# Patient Record
Sex: MALE | Race: WHITE | Employment: FULL TIME | URBAN - METROPOLITAN AREA
[De-identification: names, ages, dates, MRNs, and addresses within clinical notes are randomized per-mention and may not be internally consistent; named-entity substitution may affect disease eponyms.]

---

## 2020-03-24 ENCOUNTER — HOSPITAL ENCOUNTER (EMERGENCY)
Age: 30
Discharge: HOME | End: 2020-03-24
Attending: EMERGENCY MEDICINE
Payer: COMMERCIAL

## 2020-03-24 ENCOUNTER — APPOINTMENT (OUTPATIENT)
Dept: ULTRASOUND IMAGING | Age: 30
End: 2020-03-24
Attending: EMERGENCY MEDICINE
Payer: COMMERCIAL

## 2020-03-24 VITALS
WEIGHT: 220 LBS | BODY MASS INDEX: 43.19 KG/M2 | HEART RATE: 92 BPM | OXYGEN SATURATION: 99 % | TEMPERATURE: 98 F | RESPIRATION RATE: 16 BRPM | DIASTOLIC BLOOD PRESSURE: 95 MMHG | HEIGHT: 60 IN | SYSTOLIC BLOOD PRESSURE: 152 MMHG

## 2020-03-24 DIAGNOSIS — N50.819 TESTICULAR PAIN: Primary | ICD-10-CM

## 2020-03-24 LAB
ANION GAP SERPL CALC-SCNC: 4 MMOL/L (ref 7–16)
BASOPHILS # BLD: 0 K/UL (ref 0–0.2)
BASOPHILS NFR BLD: 1 % (ref 0–2)
BUN SERPL-MCNC: 13 MG/DL (ref 6–23)
CALCIUM SERPL-MCNC: 9.4 MG/DL (ref 8.3–10.4)
CHLORIDE SERPL-SCNC: 106 MMOL/L (ref 98–107)
CO2 SERPL-SCNC: 29 MMOL/L (ref 21–32)
CREAT SERPL-MCNC: 1.09 MG/DL (ref 0.8–1.5)
DIFFERENTIAL METHOD BLD: NORMAL
EOSINOPHIL # BLD: 0 K/UL (ref 0–0.8)
EOSINOPHIL NFR BLD: 1 % (ref 0.5–7.8)
ERYTHROCYTE [DISTWIDTH] IN BLOOD BY AUTOMATED COUNT: 12.1 % (ref 11.9–14.6)
GLUCOSE SERPL-MCNC: 88 MG/DL (ref 65–100)
HCT VFR BLD AUTO: 44.6 % (ref 41.1–50.3)
HGB BLD-MCNC: 15.2 G/DL (ref 13.6–17.2)
IMM GRANULOCYTES # BLD AUTO: 0 K/UL (ref 0–0.5)
IMM GRANULOCYTES NFR BLD AUTO: 1 % (ref 0–5)
LYMPHOCYTES # BLD: 2 K/UL (ref 0.5–4.6)
LYMPHOCYTES NFR BLD: 31 % (ref 13–44)
MCH RBC QN AUTO: 29.8 PG (ref 26.1–32.9)
MCHC RBC AUTO-ENTMCNC: 34.1 G/DL (ref 31.4–35)
MCV RBC AUTO: 87.5 FL (ref 79.6–97.8)
MONOCYTES # BLD: 0.5 K/UL (ref 0.1–1.3)
MONOCYTES NFR BLD: 8 % (ref 4–12)
NEUTS SEG # BLD: 3.9 K/UL (ref 1.7–8.2)
NEUTS SEG NFR BLD: 59 % (ref 43–78)
NRBC # BLD: 0 K/UL (ref 0–0.2)
PLATELET # BLD AUTO: 252 K/UL (ref 150–450)
PMV BLD AUTO: 9.6 FL (ref 9.4–12.3)
POTASSIUM SERPL-SCNC: 3.7 MMOL/L (ref 3.5–5.1)
RBC # BLD AUTO: 5.1 M/UL (ref 4.23–5.6)
SODIUM SERPL-SCNC: 139 MMOL/L (ref 136–145)
WBC # BLD AUTO: 6.5 K/UL (ref 4.3–11.1)

## 2020-03-24 PROCEDURE — 85025 COMPLETE CBC W/AUTO DIFF WBC: CPT

## 2020-03-24 PROCEDURE — 99282 EMERGENCY DEPT VISIT SF MDM: CPT

## 2020-03-24 PROCEDURE — 76870 US EXAM SCROTUM: CPT

## 2020-03-24 PROCEDURE — 81003 URINALYSIS AUTO W/O SCOPE: CPT

## 2020-03-24 PROCEDURE — 80048 BASIC METABOLIC PNL TOTAL CA: CPT

## 2020-03-24 NOTE — ED PROVIDER NOTES
Rey Montiel is a 34 y.o. male seen on 3/24/2020 at 11:50 AM in the [facility] EMERGENCY DEPT in room ER15/15. Chief Complaint   Patient presents with    Testicle Pain     HPI: 44-year-old male presenting to the emergency department complaint of right testicular pain. His symptoms began on Thursday. He notes pain that is constant but waxes and wanes in intensity. The pain is primarily in his right testicle. He has not noticed any redness or swelling of the testicle. For the first 2 to 3 days his testicle seemed very tender but today it seems less so. He notes that today he also felt some discomfort in his right flank but that has also resolved. He has a history of an inguinal hernia on the right but notes that this feels different. There are no obvious alleviating or exacerbating symptoms. He is not had any pain with urination increased urinary frequency or discharge. No history of sexually transmitted infections. He is sexually active with a single partner. Denies abdominal pain, no history of kidney stones. Historian: patient    REVIEW OF SYSTEMS     Review of Systems   Constitutional: Negative for fatigue and fever. HENT: Negative. Eyes: Negative. Respiratory: Negative for cough, chest tightness, shortness of breath and wheezing. Cardiovascular: Negative for chest pain. Gastrointestinal: Negative for abdominal distention, abdominal pain, diarrhea, nausea and vomiting. Genitourinary: Positive for flank pain and testicular pain. Negative for dysuria, frequency, genital sores and urgency. Skin: Negative for rash. Neurological: Negative for dizziness, syncope and headaches. All other systems reviewed and are negative. PAST MEDICAL HISTORY     No past medical history on file. No past surgical history on file.   Social History     Socioeconomic History    Marital status: SINGLE     Spouse name: Not on file    Number of children: Not on file    Years of education: Not on file    Highest education level: Not on file     None     No Known Allergies     PHYSICAL EXAM       There were no vitals filed for this visit. Vital signs were reviewed. Physical Exam  Vitals signs reviewed. Constitutional:       General: He is not in acute distress. Appearance: He is well-developed. He is not diaphoretic. HENT:      Head: Normocephalic and atraumatic. Eyes:      Pupils: Pupils are equal, round, and reactive to light. Neck:      Musculoskeletal: Normal range of motion and neck supple. Cardiovascular:      Rate and Rhythm: Normal rate and regular rhythm. Heart sounds: Normal heart sounds. No murmur. No friction rub. No gallop. Pulmonary:      Effort: Pulmonary effort is normal. No respiratory distress. Breath sounds: Normal breath sounds. No wheezing. Abdominal:      General: Bowel sounds are normal. There is no distension. Palpations: Abdomen is soft. There is no mass. Tenderness: There is no abdominal tenderness. There is no guarding or rebound. Hernia: There is no hernia in the right inguinal area or left inguinal area. Genitourinary:     Penis: Normal and circumcised. Scrotum/Testes:         Right: Mass, tenderness or swelling not present. Cremasteric reflex is present. Left: Mass, tenderness or swelling not present. Cremasteric reflex is present. Comments: No abnormal lie  Musculoskeletal: Normal range of motion. General: No tenderness or deformity. Skin:     General: Skin is warm. Findings: No erythema or rash. Neurological:      Mental Status: He is alert and oriented to person, place, and time. Sensory: No sensory deficit. Coordination: Coordination normal.      Comments: No focal deficits   Psychiatric:         Behavior: Behavior normal.         Thought Content:  Thought content normal.          MEDICAL DECISION MAKING     ED Course: Patient's history is relatively nonspecific. Is not classic for torsion, epididymitis, orchitis, stone etc.  We will begin with scrotal ultrasound to evaluate for torsion/epididymitis. On exam he has no findings concerning for hernia. If his scrotal ultrasound is normal he may benefit from a CT urogram to evaluate for kidney stone. Disposition: Care transition to Dr. Millie Faustin  Diagnosis:  R testicle pain  ____________________________________________________________________  A portion of this note was generated using voice recognition dictation software. While the note has been reviewed for accuracy, please note certain words and phrases may not be transcribed as intended and some grammatical and/or typographical errors may be present.

## 2020-03-24 NOTE — ED NOTES
I have reviewed discharge instructions with the patient. The patient verbalized understanding. Patient left ED via Discharge Method: ambulatory to Home with self    Opportunity for questions and clarification provided. Patient given 0 scripts. To continue your aftercare when you leave the hospital, you may receive an automated call from our care team to check in on how you are doing. This is a free service and part of our promise to provide the best care and service to meet your aftercare needs.  If you have questions, or wish to unsubscribe from this service please call [phone]. Thank you for Choosing our [address] [place] [address] Emergency Department.

## 2020-03-24 NOTE — ED TRIAGE NOTES
Pt comes to EMS and says testicle pain Thursday night on right side. Persisted throughout Thursday and to Friday. Saturday back started hurting with sharp pain on right side and now on both sides and now into back.

## 2020-04-02 ENCOUNTER — APPOINTMENT (OUTPATIENT)
Dept: CT IMAGING | Age: 30
End: 2020-04-02
Attending: NURSE PRACTITIONER
Payer: COMMERCIAL

## 2020-04-02 ENCOUNTER — HOSPITAL ENCOUNTER (EMERGENCY)
Age: 30
Discharge: HOME | End: 2020-04-02
Attending: EMERGENCY MEDICINE
Payer: COMMERCIAL

## 2020-04-02 VITALS
OXYGEN SATURATION: 98 % | HEART RATE: 79 BPM | SYSTOLIC BLOOD PRESSURE: 158 MMHG | TEMPERATURE: 98.2 F | BODY MASS INDEX: 29.8 KG/M2 | WEIGHT: 220 LBS | RESPIRATION RATE: 16 BRPM | HEIGHT: 72 IN | DIASTOLIC BLOOD PRESSURE: 79 MMHG

## 2020-04-02 DIAGNOSIS — R10.9 FLANK PAIN: Primary | ICD-10-CM

## 2020-04-02 DIAGNOSIS — R30.0 DYSURIA: ICD-10-CM

## 2020-04-02 LAB
ALBUMIN SERPL-MCNC: 4.3 G/DL (ref 3.5–5)
ALBUMIN/GLOB SERPL: 1.3 {RATIO} (ref 1.2–3.5)
ALP SERPL-CCNC: 78 U/L (ref 50–136)
ALT SERPL-CCNC: 32 U/L (ref 12–65)
ANION GAP SERPL CALC-SCNC: 8 MMOL/L (ref 7–16)
AST SERPL-CCNC: 18 U/L (ref 15–37)
BACTERIA URNS QL MICRO: 0 /HPF
BASOPHILS # BLD: 0 K/UL (ref 0–0.2)
BASOPHILS NFR BLD: 1 % (ref 0–2)
BILIRUB SERPL-MCNC: 0.6 MG/DL (ref 0.2–1.1)
BUN SERPL-MCNC: 15 MG/DL (ref 6–23)
CALCIUM SERPL-MCNC: 9.1 MG/DL (ref 8.3–10.4)
CASTS URNS QL MICRO: 0 /LPF
CHLORIDE SERPL-SCNC: 105 MMOL/L (ref 98–107)
CO2 SERPL-SCNC: 28 MMOL/L (ref 21–32)
CREAT SERPL-MCNC: 1.05 MG/DL (ref 0.8–1.5)
DIFFERENTIAL METHOD BLD: NORMAL
EOSINOPHIL # BLD: 0.1 K/UL (ref 0–0.8)
EOSINOPHIL NFR BLD: 1 % (ref 0.5–7.8)
EPI CELLS #/AREA URNS HPF: 0 /HPF
ERYTHROCYTE [DISTWIDTH] IN BLOOD BY AUTOMATED COUNT: 12.1 % (ref 11.9–14.6)
GLOBULIN SER CALC-MCNC: 3.3 G/DL (ref 2.3–3.5)
GLUCOSE SERPL-MCNC: 89 MG/DL (ref 65–100)
HCT VFR BLD AUTO: 44.5 % (ref 41.1–50.3)
HGB BLD-MCNC: 15.3 G/DL (ref 13.6–17.2)
IMM GRANULOCYTES # BLD AUTO: 0 K/UL (ref 0–0.5)
IMM GRANULOCYTES NFR BLD AUTO: 1 % (ref 0–5)
LYMPHOCYTES # BLD: 1.8 K/UL (ref 0.5–4.6)
LYMPHOCYTES NFR BLD: 29 % (ref 13–44)
MCH RBC QN AUTO: 29.8 PG (ref 26.1–32.9)
MCHC RBC AUTO-ENTMCNC: 34.4 G/DL (ref 31.4–35)
MCV RBC AUTO: 86.6 FL (ref 79.6–97.8)
MONOCYTES # BLD: 0.4 K/UL (ref 0.1–1.3)
MONOCYTES NFR BLD: 7 % (ref 4–12)
NEUTS SEG # BLD: 4 K/UL (ref 1.7–8.2)
NEUTS SEG NFR BLD: 63 % (ref 43–78)
NRBC # BLD: 0 K/UL (ref 0–0.2)
PLATELET # BLD AUTO: 243 K/UL (ref 150–450)
PMV BLD AUTO: 9.4 FL (ref 9.4–12.3)
POTASSIUM SERPL-SCNC: 3.7 MMOL/L (ref 3.5–5.1)
PROT SERPL-MCNC: 7.6 G/DL (ref 6.3–8.2)
RBC # BLD AUTO: 5.14 M/UL (ref 4.23–5.6)
RBC #/AREA URNS HPF: 0 /HPF
SODIUM SERPL-SCNC: 141 MMOL/L (ref 136–145)
WBC # BLD AUTO: 6.4 K/UL (ref 4.3–11.1)
WBC URNS QL MICRO: NORMAL /HPF

## 2020-04-02 PROCEDURE — 96374 THER/PROPH/DIAG INJ IV PUSH: CPT

## 2020-04-02 PROCEDURE — 81015 MICROSCOPIC EXAM OF URINE: CPT

## 2020-04-02 PROCEDURE — 99284 EMERGENCY DEPT VISIT MOD MDM: CPT

## 2020-04-02 PROCEDURE — 85025 COMPLETE CBC W/AUTO DIFF WBC: CPT

## 2020-04-02 PROCEDURE — 74176 CT ABD & PELVIS W/O CONTRAST: CPT

## 2020-04-02 PROCEDURE — 87491 CHLMYD TRACH DNA AMP PROBE: CPT

## 2020-04-02 PROCEDURE — 74011250636 HC RX REV CODE- 250/636: Performed by: NURSE PRACTITIONER

## 2020-04-02 PROCEDURE — 80053 COMPREHEN METABOLIC PANEL: CPT

## 2020-04-02 RX ORDER — HYDROMORPHONE HYDROCHLORIDE 1 MG/ML
1 INJECTION, SOLUTION INTRAMUSCULAR; INTRAVENOUS; SUBCUTANEOUS
Status: DISCONTINUED | OUTPATIENT
Start: 2020-04-02 | End: 2020-04-02

## 2020-04-02 RX ORDER — SODIUM CHLORIDE 9 MG/ML
1000 INJECTION, SOLUTION INTRAVENOUS ONCE
Status: COMPLETED | OUTPATIENT
Start: 2020-04-02 | End: 2020-04-02

## 2020-04-02 RX ORDER — KETOROLAC TROMETHAMINE 30 MG/ML
30 INJECTION, SOLUTION INTRAMUSCULAR; INTRAVENOUS
Status: COMPLETED | OUTPATIENT
Start: 2020-04-02 | End: 2020-04-02

## 2020-04-02 RX ORDER — SULFAMETHOXAZOLE AND TRIMETHOPRIM 800; 160 MG/1; MG/1
1 TABLET ORAL 2 TIMES DAILY
Qty: 14 TAB | Refills: 0 | Status: SHIPPED | OUTPATIENT
Start: 2020-04-02 | End: 2020-04-09

## 2020-04-02 RX ORDER — DICLOFENAC SODIUM 50 MG/1
50 TABLET, DELAYED RELEASE ORAL 2 TIMES DAILY
Qty: 20 TAB | Refills: 0 | Status: SHIPPED | OUTPATIENT
Start: 2020-04-02

## 2020-04-02 RX ADMIN — SODIUM CHLORIDE 1000 ML: 900 INJECTION, SOLUTION INTRAVENOUS at 11:19

## 2020-04-02 RX ADMIN — KETOROLAC TROMETHAMINE 30 MG: 30 INJECTION, SOLUTION INTRAMUSCULAR at 11:17

## 2020-04-02 NOTE — ED PROVIDER NOTES
Patient presents with right sided flank pain that radiates into his RLQ. He states pain started last week and he was evaluated in this ED. He states he had a negative scrotum ultrasound and was discharged home. He states he has continued to have flank pain and RLQ pain. He states nausea and dysuria. He denies hematuria. He denies penial discharge and denies scrotal pain. The history is provided by the patient. Back Pain    This is a new problem. The current episode started more than 1 week ago. The problem has been gradually worsening. The problem occurs constantly. Patient reports not work related injury. The pain is associated with no known injury. The pain is at a severity of 5/10. The pain is mild. Associated symptoms include abdominal pain and dysuria. Pertinent negatives include no chest pain, no fever, no numbness, no weight loss, no headaches, no abdominal swelling, no bowel incontinence, no perianal numbness, no bladder incontinence, no pelvic pain, no leg pain, no paresthesias, no paresis, no tingling and no weakness. He has tried nothing for the symptoms. History reviewed. No pertinent past medical history. Past Surgical History:   Procedure Laterality Date    HX HERNIA REPAIR           History reviewed. No pertinent family history.     Social History     Socioeconomic History    Marital status: SINGLE     Spouse name: Not on file    Number of children: Not on file    Years of education: Not on file    Highest education level: Not on file   Occupational History    Not on file   Social Needs    Financial resource strain: Not on file    Food insecurity     Worry: Not on file     Inability: Not on file    Transportation needs     Medical: Not on file     Non-medical: Not on file   Tobacco Use    Smoking status: Former Smoker    Smokeless tobacco: Never Used   Substance and Sexual Activity    Alcohol use: Not Currently    Drug use: Not on file    Sexual activity: Yes     Partners: Female   Lifestyle    Physical activity     Days per week: Not on file     Minutes per session: Not on file    Stress: Not on file   Relationships    Social connections     Talks on phone: Not on file     Gets together: Not on file     Attends [employer] service: Not on file     Active member of club or organization: Not on file     Attends meetings of clubs or organizations: Not on file     Relationship status: Not on file    Intimate partner violence     Fear of current or ex partner: Not on file     Emotionally abused: Not on file     Physically abused: Not on file     Forced sexual activity: Not on file   Other Topics Concern    Not on file   Social History Narrative    Not on file         ALLERGIES: Patient has no known allergies. Review of Systems   Constitutional: Negative for fever and weight loss. Cardiovascular: Negative for chest pain. Gastrointestinal: Positive for abdominal pain and nausea. Negative for bowel incontinence. Genitourinary: Positive for dysuria. Negative for bladder incontinence and pelvic pain. Musculoskeletal: Positive for back pain. Neurological: Negative for tingling, weakness, numbness, headaches and paresthesias. Vitals:    04/02/20 1051   BP: (!) 149/91   Pulse: (!) 102   Resp: 16   Temp: 98.2 °F (36.8 °C)   SpO2: 96%   Weight: 99.8 kg (220 lb)   Height: 6' (1.829 m)            Physical Exam  Vitals signs and nursing note reviewed. Constitutional:       Appearance: Normal appearance. HENT:      Head: Normocephalic and atraumatic. Nose: Nose normal.      Mouth/Throat:      Mouth: Mucous membranes are moist.   Eyes:      Pupils: Pupils are equal, round, and reactive to light. Cardiovascular:      Rate and Rhythm: Normal rate and regular rhythm. Pulses: Normal pulses. Heart sounds: Normal heart sounds. Pulmonary:      Effort: Pulmonary effort is normal.      Breath sounds: Normal breath sounds. Abdominal:      General: Abdomen is flat. Bowel sounds are normal.      Palpations: Abdomen is soft. Tenderness: There is abdominal tenderness in the right lower quadrant. Musculoskeletal:      Lumbar back: He exhibits tenderness and pain. Back:    Skin:     General: Skin is warm and dry. Neurological:      General: No focal deficit present. Mental Status: He is alert and oriented to person, place, and time. Psychiatric:         Mood and Affect: Mood normal.         Behavior: Behavior normal.        Recent Results (from the past 12 hour(s))   URINE MICROSCOPIC    Collection Time: 04/02/20 10:57 AM   Result Value Ref Range    WBC 0-3 0 /hpf    RBC 0 0 /hpf    Epithelial cells 0 0 /hpf    Bacteria 0 0 /hpf    Casts 0 0 /lpf   CBC WITH AUTOMATED DIFF    Collection Time: 04/02/20 11:17 AM   Result Value Ref Range    WBC 6.4 4.3 - 11.1 K/uL    RBC 5.14 4.23 - 5.6 M/uL    HGB 15.3 13.6 - 17.2 g/dL    HCT 44.5 41.1 - 50.3 %    MCV 86.6 79.6 - 97.8 FL    MCH 29.8 26.1 - 32.9 PG    MCHC 34.4 31.4 - 35.0 g/dL    RDW 12.1 11.9 - 14.6 %    PLATELET 953 604 - 275 K/uL    MPV 9.4 9.4 - 12.3 FL    ABSOLUTE NRBC 0.00 0.0 - 0.2 K/uL    DF AUTOMATED      NEUTROPHILS 63 43 - 78 %    LYMPHOCYTES 29 13 - 44 %    MONOCYTES 7 4.0 - 12.0 %    EOSINOPHILS 1 0.5 - 7.8 %    BASOPHILS 1 0.0 - 2.0 %    IMMATURE GRANULOCYTES 1 0.0 - 5.0 %    ABS. NEUTROPHILS 4.0 1.7 - 8.2 K/UL    ABS. LYMPHOCYTES 1.8 0.5 - 4.6 K/UL    ABS. MONOCYTES 0.4 0.1 - 1.3 K/UL    ABS. EOSINOPHILS 0.1 0.0 - 0.8 K/UL    ABS. BASOPHILS 0.0 0.0 - 0.2 K/UL    ABS. IMM.  GRANS. 0.0 0.0 - 0.5 K/UL   METABOLIC PANEL, COMPREHENSIVE    Collection Time: 04/02/20 11:17 AM   Result Value Ref Range    Sodium 141 136 - 145 mmol/L    Potassium 3.7 3.5 - 5.1 mmol/L    Chloride 105 98 - 107 mmol/L    CO2 28 21 - 32 mmol/L    Anion gap 8 7 - 16 mmol/L    Glucose 89 65 - 100 mg/dL    BUN 15 6 - 23 MG/DL    Creatinine 1.05 0.8 - 1.5 MG/DL    GFR est AA >60 >60 ml/min/1.73m2    GFR est non-AA >60 >60 ml/min/1.73m2    Calcium 9.1 8.3 - 10.4 MG/DL    Bilirubin, total 0.6 0.2 - 1.1 MG/DL    ALT (SGPT) 32 12 - 65 U/L    AST (SGOT) 18 15 - 37 U/L    Alk. phosphatase 78 50 - 136 U/L    Protein, total 7.6 6.3 - 8.2 g/dL    Albumin 4.3 3.5 - 5.0 g/dL    Globulin 3.3 2.3 - 3.5 g/dL    A-G Ratio 1.3 1.2 - 3.5           Ct Urogram Wo Cont    Result Date: 4/2/2020  EXAM: CT of the abdomen and pelvis without contrast. Indication: Back and testicular pain for greater than one week along the right abdomen. Comparison: None. Multiple axial images were obtained through the abdomen and pelvis without IV contrast.  Radiation dose reduction techniques were used for this study: All CT scans performed at this facility use one or all of the following: Automated exposure control, adjustment of the mA and/or kVp according to patient's size, iterative reconstruction. FINDINGS: LOWER THORAX: Lung bases are clear. LIVER: Unenhanced liver is unremarkable. BILIARY SYSTEM: The gallbladder is normal. No biliary duct dilation. SPLEEN: No splenomegaly. PANCREAS: No pancreatic mass. No pancreatic duct dilation. ADRENALS: No adrenal nodule or adrenal hypertrophy. URINARY SYSTEM: No exophytic renal lesion. No renal stone. No hydronephrosis. The bladder is unremarkable. FLUID:  No free fluid. REPRODUCTIVE ORGANS: Small left hydrocele, partially imaged. BOWEL: Stomach, small bowel, and large bowel are normal in course and caliber. No evidence of obstruction. Appendix is within normal limits. VASCULAR/NODES: No aortic or iliac artery aneurysm. No lymphadenopathy. BONES/SUBCUTANEOUS TISSUE: No aggressive osseous lesion. No subcutaneous soft tissue abnormality. IMPRESSION: 1. No acute abnormality within the abdomen or pelvis. 2.  Small left hydrocele, partially imaged. MDM  Number of Diagnoses or Management Options  Dysuria:   Flank pain:   Diagnosis management comments: CT scan negative for kidney stone and other acute abnormalities.  UA negative for infection. Urine sent for GC/C testing. Patient referred to urology for follow up. Patient was discussed with Dr. Masha Argueta.         Amount and/or Complexity of Data Reviewed  Clinical lab tests: reviewed and ordered  Tests in the radiology section of CPT®: ordered and reviewed  Tests in the medicine section of CPT®: ordered and reviewed  Discuss the patient with other providers: yes (Masha Argueta  )    Patient Progress  Patient progress: stable    ED Course as of Apr 02 1121   Thu Apr 02, 2020   1113 Patient discussed with Dr. Masha Argueta    [JM]      ED Course User Index  [JM] RONDA Ruiz       Procedures

## 2020-04-02 NOTE — ED NOTES
I have reviewed discharge instructions with the patient. The patient verbalized understanding. Patient left ED via Discharge Method: ambulatory to Home with self  Opportunity for questions and clarification provided. Patient given 2 scripts. To continue your aftercare when you leave the hospital, you may receive an automated call from our care team to check in on how you are doing. This is a free service and part of our promise to provide the best care and service to meet your aftercare needs.  If you have questions, or wish to unsubscribe from this service please call [phone]. Thank you for Choosing our [facility] Emergency Department.

## 2020-04-02 NOTE — ED TRIAGE NOTES
Pt reports back pain and testicular pain for over a week. States pain right sided of abd pain as well. Also states cloudy smelly urine. Pt denies hx of kidney stones. Denies penile discharge.  NAD

## 2020-04-02 NOTE — DISCHARGE INSTRUCTIONS
Call Dr. Temi Blanca office to schedule an appointment. Diclofenac as prescribed. Return to the emergency department as for any new or worsening symptoms.

## 2020-04-03 LAB
C TRACH RRNA SPEC QL NAA+PROBE: NEGATIVE
N GONORRHOEA RRNA SPEC QL NAA+PROBE: NEGATIVE
SPECIMEN SOURCE: NORMAL